# Patient Record
Sex: FEMALE | Race: WHITE | NOT HISPANIC OR LATINO | Employment: OTHER | URBAN - METROPOLITAN AREA
[De-identification: names, ages, dates, MRNs, and addresses within clinical notes are randomized per-mention and may not be internally consistent; named-entity substitution may affect disease eponyms.]

---

## 2023-10-12 DIAGNOSIS — F32.9 MAJOR DEPRESSIVE DISORDER, SINGLE EPISODE, UNSPECIFIED: ICD-10-CM

## 2023-10-13 RX ORDER — SERTRALINE HYDROCHLORIDE 100 MG/1
100 TABLET, FILM COATED ORAL DAILY
Qty: 90 TABLET | Refills: 0 | Status: SHIPPED | OUTPATIENT
Start: 2023-10-13 | End: 2024-02-13

## 2024-01-10 DIAGNOSIS — E78.1 HIGH TRIGLYCERIDES: Primary | ICD-10-CM

## 2024-01-10 RX ORDER — FENOFIBRIC ACID 135 MG/1
135 CAPSULE, DELAYED RELEASE ORAL DAILY
Qty: 90 CAPSULE | Refills: 0 | Status: SHIPPED | OUTPATIENT
Start: 2024-01-10

## 2024-01-10 RX ORDER — FENOFIBRIC ACID 135 MG/1
135 CAPSULE, DELAYED RELEASE ORAL DAILY
COMMUNITY
End: 2024-02-13 | Stop reason: WASHOUT

## 2024-02-13 ENCOUNTER — OFFICE VISIT (OUTPATIENT)
Dept: CARDIOLOGY | Facility: CLINIC | Age: 64
End: 2024-02-13
Payer: COMMERCIAL

## 2024-02-13 VITALS
OXYGEN SATURATION: 96 % | RESPIRATION RATE: 16 BRPM | SYSTOLIC BLOOD PRESSURE: 164 MMHG | HEART RATE: 66 BPM | BODY MASS INDEX: 36.37 KG/M2 | DIASTOLIC BLOOD PRESSURE: 88 MMHG | WEIGHT: 213 LBS | HEIGHT: 64 IN

## 2024-02-13 DIAGNOSIS — Z95.0 PACEMAKER: Primary | ICD-10-CM

## 2024-02-13 DIAGNOSIS — I10 HYPERTENSION, UNSPECIFIED TYPE: ICD-10-CM

## 2024-02-13 PROCEDURE — 93005 ELECTROCARDIOGRAM TRACING: CPT | Performed by: INTERNAL MEDICINE

## 2024-02-13 PROCEDURE — 1036F TOBACCO NON-USER: CPT | Performed by: INTERNAL MEDICINE

## 2024-02-13 PROCEDURE — 93010 ELECTROCARDIOGRAM REPORT: CPT | Performed by: INTERNAL MEDICINE

## 2024-02-13 PROCEDURE — 99213 OFFICE O/P EST LOW 20 MIN: CPT | Mod: 25,27 | Performed by: INTERNAL MEDICINE

## 2024-02-13 PROCEDURE — 99213 OFFICE O/P EST LOW 20 MIN: CPT | Performed by: INTERNAL MEDICINE

## 2024-02-13 PROCEDURE — 3079F DIAST BP 80-89 MM HG: CPT | Performed by: INTERNAL MEDICINE

## 2024-02-13 PROCEDURE — 3077F SYST BP >= 140 MM HG: CPT | Performed by: INTERNAL MEDICINE

## 2024-02-13 RX ORDER — AMLODIPINE BESYLATE 10 MG/1
10 TABLET ORAL DAILY
COMMUNITY

## 2024-02-13 ASSESSMENT — ENCOUNTER SYMPTOMS
GASTROINTESTINAL NEGATIVE: 1
HEMATOLOGIC/LYMPHATIC NEGATIVE: 1
RESPIRATORY NEGATIVE: 1
CARDIOVASCULAR NEGATIVE: 1
PSYCHIATRIC NEGATIVE: 1
ENDOCRINE NEGATIVE: 1
DEPRESSION: 0
MUSCULOSKELETAL NEGATIVE: 1
EYES NEGATIVE: 1
CONSTITUTIONAL NEGATIVE: 1
NEUROLOGICAL NEGATIVE: 1
ALLERGIC/IMMUNOLOGIC NEGATIVE: 1

## 2024-02-13 ASSESSMENT — PATIENT HEALTH QUESTIONNAIRE - PHQ9
SUM OF ALL RESPONSES TO PHQ9 QUESTIONS 1 AND 2: 0
1. LITTLE INTEREST OR PLEASURE IN DOING THINGS: NOT AT ALL
2. FEELING DOWN, DEPRESSED OR HOPELESS: NOT AT ALL

## 2024-02-13 NOTE — PROGRESS NOTES
"Chief Complaint:   Follow-up (Annual FUV)     History Of Present Illness:    Abril Arreola is a 63 y.o. female presenting for follow-up.  Doing well from a cardiac standpoint.  Denies any chest pain, shortness of breath, dizziness, palpitations.  No lower extremity edema.  Active taking care of her 18-month granddaughter 3 days a week.     Last Recorded Vitals:  Vitals:    02/13/24 1026   BP: 164/88   BP Location: Left arm   Patient Position: Sitting   BP Cuff Size: Large adult   Pulse: 66   Resp: 16   SpO2: 96%   Weight: 96.6 kg (213 lb)   Height: 1.626 m (5' 4\")       Past Medical History:  She has no past medical history on file.    Past Surgical History:  She has a past surgical history that includes Other surgical history (05/19/2022).      Social History:  She reports that she has never smoked. She has never used smokeless tobacco. She reports that she does not currently use alcohol. She reports that she does not use drugs.    Family History:  No family history on file.     Allergies:  Codeine and Morphine    Outpatient Medications:  Current Outpatient Medications   Medication Instructions    amLODIPine (NORVASC) 10 mg, oral, Daily, for blood pressure    choline fenofibrate (TRILIPIX) 135 mg, oral, Daily    L. acidophilus/Bifid. animalis 15.5 billion cell capsule oral    sertraline (ZOLOFT) 100 mg, oral, Daily     Review of Systems   Constitutional: Negative.    HENT: Negative.     Eyes: Negative.    Respiratory: Negative.     Cardiovascular: Negative.    Gastrointestinal: Negative.    Endocrine: Negative.    Genitourinary: Negative.    Musculoskeletal: Negative.    Skin: Negative.    Allergic/Immunologic: Negative.    Neurological: Negative.    Hematological: Negative.    Psychiatric/Behavioral: Negative.          Physical Exam:  Constitutional:       Appearance: Healthy appearance. Not in distress.   Neck:      Vascular: No JVR. JVD normal.   Pulmonary:      Effort: Pulmonary effort is normal.      " Breath sounds: Normal breath sounds. No wheezing. No rhonchi. No rales.   Chest:      Chest wall: Not tender to palpatation.   Cardiovascular:      Normal rate. Regular rhythm.      Murmurs: There is no murmur.   Edema:     Peripheral edema absent.   Abdominal:      General: Bowel sounds are normal.      Palpations: Abdomen is soft.      Tenderness: There is no abdominal tenderness.   Musculoskeletal: Normal range of motion. Skin:     General: Skin is warm and dry.   Neurological:      General: No focal deficit present.      Mental Status: Alert and oriented to person, place and time.           Last Labs:  CBC -  Lab Results   Component Value Date    WBC 14.5 (H) 01/10/2023    HGB 12.9 01/10/2023    HCT 38.7 01/10/2023    MCV 90 01/10/2023     01/10/2023       CMP -  Lab Results   Component Value Date    CALCIUM 9.1 06/23/2023    PROT 6.7 06/23/2023    ALBUMIN 4.1 06/23/2023    AST 20 06/23/2023    ALT 19 06/23/2023    ALKPHOS 45 06/23/2023    BILITOT 0.4 06/23/2023       LIPID PANEL -   Lab Results   Component Value Date    CHOL 186 06/23/2023    TRIG 111 06/23/2023    HDL 52 06/23/2023    CHHDL 3.6 06/23/2023       RENAL FUNCTION PANEL -   Lab Results   Component Value Date    GLUCOSE 104 (H) 06/23/2023     06/23/2023    K 4.2 06/23/2023     06/23/2023    CO2 26 06/23/2023    ANIONGAP 9 06/23/2023    BUN 15 06/23/2023    CREATININE 0.8 06/23/2023    CALCIUM 9.1 06/23/2023    ALBUMIN 4.1 06/23/2023        Lab Results   Component Value Date    HGBA1C 5.4 06/23/2023       Last Cardiology Tests:  EKG Independently reviewed from today: a-paced, rate 67     Echocardiogram 6/28/2022  1. The left ventricular systolic function is normal with a 60-65% estimated ejection fraction.  2. There is trace-mild mitral and tricuspid regurgitation.     CT calcium score 6/28/2022  LM 0,  LAD 0,  LCx 0,  RCA 0,     Total 0    Assessment/Plan   Very pleasant 63-year-old female with hypertension, hypertriglyceridemia,  status post pacemaker in 2007 for symptomatic bradycardia with battery change in 2019. Doing well clinically--blood pressure is elevated today, she did not take her amlodipine this morning yet.  Will hold off on escalating antihypertensive therapy for now, however will need to closely follow down the line.  Plan to continue current amlodipine and fenofibrate. F/U in one year or sooner if needed.       Gaurav Lomeli MD

## 2024-02-14 LAB
ATRIAL RATE: 67 BPM
P AXIS: 60 DEGREES
P OFFSET: 177 MS
P ONSET: 132 MS
PR INTERVAL: 132 MS
Q ONSET: 198 MS
QRS COUNT: 11 BEATS
QRS DURATION: 158 MS
QT INTERVAL: 486 MS
QTC CALCULATION(BAZETT): 513 MS
QTC FREDERICIA: 504 MS
R AXIS: 114 DEGREES
T AXIS: 0 DEGREES
T OFFSET: 441 MS
VENTRICULAR RATE: 67 BPM

## 2024-02-20 ENCOUNTER — HOSPITAL ENCOUNTER (OUTPATIENT)
Dept: CARDIOLOGY | Facility: CLINIC | Age: 64
Discharge: HOME | End: 2024-02-20
Payer: COMMERCIAL

## 2024-02-20 DIAGNOSIS — Z95.0 PRESENCE OF CARDIAC PACEMAKER: ICD-10-CM

## 2024-02-20 DIAGNOSIS — R00.1 BRADYCARDIA, UNSPECIFIED: ICD-10-CM

## 2024-02-20 PROCEDURE — 93296 REM INTERROG EVL PM/IDS: CPT

## 2024-02-20 PROCEDURE — 93294 REM INTERROG EVL PM/LDLS PM: CPT | Performed by: INTERNAL MEDICINE

## 2024-08-21 DIAGNOSIS — Z95.0 PACEMAKER: Primary | ICD-10-CM

## 2024-08-21 DIAGNOSIS — R00.1 BRADYCARDIA ON ECG: ICD-10-CM

## 2024-08-28 ENCOUNTER — HOSPITAL ENCOUNTER (OUTPATIENT)
Dept: CARDIOLOGY | Facility: HOSPITAL | Age: 64
Discharge: HOME | End: 2024-08-28
Payer: COMMERCIAL

## 2024-08-28 DIAGNOSIS — Z95.0 PRESENCE OF CARDIAC PACEMAKER: ICD-10-CM

## 2024-08-28 DIAGNOSIS — R00.1 BRADYCARDIA, UNSPECIFIED: ICD-10-CM

## 2024-08-28 PROCEDURE — 93280 PM DEVICE PROGR EVAL DUAL: CPT | Performed by: NURSE PRACTITIONER

## 2024-08-28 PROCEDURE — 93280 PM DEVICE PROGR EVAL DUAL: CPT

## 2025-02-11 ENCOUNTER — OFFICE VISIT (OUTPATIENT)
Dept: CARDIOLOGY | Facility: CLINIC | Age: 65
End: 2025-02-11
Payer: COMMERCIAL

## 2025-02-11 VITALS
WEIGHT: 218 LBS | SYSTOLIC BLOOD PRESSURE: 167 MMHG | HEIGHT: 64 IN | OXYGEN SATURATION: 96 % | BODY MASS INDEX: 37.22 KG/M2 | DIASTOLIC BLOOD PRESSURE: 96 MMHG | HEART RATE: 80 BPM

## 2025-02-11 DIAGNOSIS — Z95.0 PACEMAKER: ICD-10-CM

## 2025-02-11 DIAGNOSIS — R00.1 BRADYCARDIA, UNSPECIFIED: ICD-10-CM

## 2025-02-11 DIAGNOSIS — I10 ESSENTIAL HYPERTENSION: Primary | ICD-10-CM

## 2025-02-11 PROCEDURE — 3080F DIAST BP >= 90 MM HG: CPT | Performed by: INTERNAL MEDICINE

## 2025-02-11 PROCEDURE — 3077F SYST BP >= 140 MM HG: CPT | Performed by: INTERNAL MEDICINE

## 2025-02-11 PROCEDURE — 93005 ELECTROCARDIOGRAM TRACING: CPT | Performed by: INTERNAL MEDICINE

## 2025-02-11 PROCEDURE — 99213 OFFICE O/P EST LOW 20 MIN: CPT | Performed by: INTERNAL MEDICINE

## 2025-02-11 PROCEDURE — 3008F BODY MASS INDEX DOCD: CPT | Performed by: INTERNAL MEDICINE

## 2025-02-11 ASSESSMENT — PATIENT HEALTH QUESTIONNAIRE - PHQ9
1. LITTLE INTEREST OR PLEASURE IN DOING THINGS: NOT AT ALL
2. FEELING DOWN, DEPRESSED OR HOPELESS: NOT AT ALL
SUM OF ALL RESPONSES TO PHQ9 QUESTIONS 1 AND 2: 0

## 2025-02-11 NOTE — PROGRESS NOTES
"Chief Complaint:   No chief complaint on file.     History Of Present Illness:    Abril Arreola is a 64 y.o. female presenting with ***.     Last Recorded Vitals:  Vitals:    02/11/25 1011   BP: (!) 167/96   BP Location: Right arm   Patient Position: Sitting   Pulse: 80   SpO2: 96%   Weight: 98.9 kg (218 lb)   Height: 1.626 m (5' 4\")       Past Medical History:  She has no past medical history on file.    Past Surgical History:  She has a past surgical history that includes Other surgical history (05/19/2022).      Social History:  She reports that she has never smoked. She has never used smokeless tobacco. She reports that she does not currently use alcohol. She reports that she does not use drugs.    Family History:  No family history on file.     Allergies:  Codeine and Morphine    Outpatient Medications:  Current Outpatient Medications   Medication Instructions    amLODIPine (NORVASC) 10 mg, Daily    choline fenofibrate (TRILIPIX) 135 mg, oral, Daily    L. acidophilus/Bifid. animalis 15.5 billion cell capsule oral    sertraline (ZOLOFT) 100 mg, oral, Daily       Physical Exam:  ***     Last Labs:  CBC -  Lab Results   Component Value Date    WBC 14.5 (H) 01/10/2023    HGB 12.9 01/10/2023    HCT 38.7 01/10/2023    MCV 90 01/10/2023     01/10/2023       CMP -  Lab Results   Component Value Date    CALCIUM 9.1 06/23/2023    PROT 6.7 06/23/2023    ALBUMIN 4.1 06/23/2023    AST 20 06/23/2023    ALT 19 06/23/2023    ALKPHOS 45 06/23/2023    BILITOT 0.4 06/23/2023       LIPID PANEL -   Lab Results   Component Value Date    CHOL 186 06/23/2023    TRIG 111 06/23/2023    HDL 52 06/23/2023    CHHDL 3.6 06/23/2023       RENAL FUNCTION PANEL -   Lab Results   Component Value Date    GLUCOSE 104 (H) 06/23/2023     06/23/2023    K 4.2 06/23/2023     06/23/2023    CO2 26 06/23/2023    ANIONGAP 9 06/23/2023    BUN 15 06/23/2023    CREATININE 0.8 06/23/2023    CALCIUM 9.1 06/23/2023    ALBUMIN 4.1 " 06/23/2023        Lab Results   Component Value Date    HGBA1C 5.4 06/23/2023       Last Cardiology Tests:  EKG Independently reviewed from today: a-paced, rate 67     Echocardiogram 6/28/2022  1. The left ventricular systolic function is normal with a 60-65% estimated ejection fraction.  2. There is trace-mild mitral and tricuspid regurgitation.     CT calcium score 6/28/2022  LM 0,  LAD 0,  LCx 0,  RCA 0,     Total 0    Assessment/Plan   Very pleasant 63-year-old female with hypertension, hypertriglyceridemia, status post pacemaker in 2007 for symptomatic bradycardia with battery change in 2019. Doing well clinically--blood pressure is elevated today, she did not take her amlodipine this morning yet.  Will hold off on escalating antihypertensive therapy for now, however will need to closely follow down the line.  Plan to continue current amlodipine and fenofibrate. F/U in one year or sooner if needed.       Gaurav Lomeli MD

## 2025-02-12 LAB
ATRIAL RATE: 65 BPM
P AXIS: 14 DEGREES
P OFFSET: 176 MS
P ONSET: 138 MS
PR INTERVAL: 144 MS
Q ONSET: 210 MS
QRS COUNT: 11 BEATS
QRS DURATION: 134 MS
QT INTERVAL: 476 MS
QTC CALCULATION(BAZETT): 495 MS
QTC FREDERICIA: 488 MS
R AXIS: 133 DEGREES
T AXIS: 15 DEGREES
T OFFSET: 448 MS
VENTRICULAR RATE: 65 BPM

## 2025-02-18 ENCOUNTER — HOSPITAL ENCOUNTER (OUTPATIENT)
Dept: CARDIOLOGY | Facility: CLINIC | Age: 65
Discharge: HOME | End: 2025-02-18
Payer: COMMERCIAL

## 2025-02-18 DIAGNOSIS — R00.1 BRADYCARDIA ON ECG: ICD-10-CM

## 2025-02-18 DIAGNOSIS — Z95.0 PACEMAKER: ICD-10-CM

## 2025-02-18 PROCEDURE — 93294 REM INTERROG EVL PM/LDLS PM: CPT | Performed by: INTERNAL MEDICINE

## 2025-02-18 PROCEDURE — 93296 REM INTERROG EVL PM/IDS: CPT

## 2025-05-06 ENCOUNTER — OFFICE VISIT (OUTPATIENT)
Dept: PRIMARY CARE | Facility: CLINIC | Age: 65
End: 2025-05-06
Payer: COMMERCIAL

## 2025-05-06 VITALS
BODY MASS INDEX: 37.59 KG/M2 | HEART RATE: 84 BPM | WEIGHT: 219 LBS | DIASTOLIC BLOOD PRESSURE: 78 MMHG | SYSTOLIC BLOOD PRESSURE: 136 MMHG

## 2025-05-06 DIAGNOSIS — M62.838 MUSCLE SPASM: Primary | ICD-10-CM

## 2025-05-06 PROBLEM — I10 HYPERTENSION: Status: ACTIVE | Noted: 2025-05-06

## 2025-05-06 PROBLEM — G47.30 SLEEP APNEA: Status: ACTIVE | Noted: 2025-05-06

## 2025-05-06 PROBLEM — Z95.0 PRESENCE OF CARDIAC PACEMAKER: Status: ACTIVE | Noted: 2025-05-06

## 2025-05-06 PROBLEM — R13.10 DYSPHAGIA: Status: ACTIVE | Noted: 2025-05-06

## 2025-05-06 PROBLEM — F32.A DEPRESSION: Status: ACTIVE | Noted: 2025-05-06

## 2025-05-06 PROBLEM — N95.1 MENOPAUSAL SYMPTOM: Status: ACTIVE | Noted: 2025-05-06

## 2025-05-06 PROBLEM — Z98.890 HISTORY OF FUNDOPLICATION: Status: ACTIVE | Noted: 2025-05-06

## 2025-05-06 PROBLEM — J40 BRONCHITIS: Status: ACTIVE | Noted: 2025-05-06

## 2025-05-06 PROBLEM — R00.1 BRADYCARDIA: Status: ACTIVE | Noted: 2025-05-06

## 2025-05-06 PROBLEM — K44.9 HIATAL HERNIA: Status: ACTIVE | Noted: 2025-05-06

## 2025-05-06 PROBLEM — I42.9 CARDIOMYOPATHY: Status: ACTIVE | Noted: 2025-05-06

## 2025-05-06 PROBLEM — R06.09 DOE (DYSPNEA ON EXERTION): Status: ACTIVE | Noted: 2025-05-06

## 2025-05-06 PROBLEM — E78.1 PURE HYPERGLYCERIDEMIA: Status: ACTIVE | Noted: 2025-05-06

## 2025-05-06 PROBLEM — K21.9 GASTROESOPHAGEAL REFLUX DISEASE: Status: ACTIVE | Noted: 2025-05-06

## 2025-05-06 PROCEDURE — 99213 OFFICE O/P EST LOW 20 MIN: CPT | Performed by: FAMILY MEDICINE

## 2025-05-06 PROCEDURE — 3078F DIAST BP <80 MM HG: CPT | Performed by: FAMILY MEDICINE

## 2025-05-06 PROCEDURE — 1036F TOBACCO NON-USER: CPT | Performed by: FAMILY MEDICINE

## 2025-05-06 PROCEDURE — 3075F SYST BP GE 130 - 139MM HG: CPT | Performed by: FAMILY MEDICINE

## 2025-05-06 RX ORDER — ACETAMINOPHEN 325 MG/1
325 TABLET ORAL EVERY 6 HOURS
COMMUNITY
Start: 2023-01-10

## 2025-05-06 RX ORDER — SERTRALINE HYDROCHLORIDE 100 MG/1
100 TABLET, FILM COATED ORAL
COMMUNITY
Start: 2022-06-08

## 2025-05-06 RX ORDER — CYCLOBENZAPRINE HCL 10 MG
10 TABLET ORAL 3 TIMES DAILY PRN
Qty: 30 TABLET | Refills: 0 | Status: SHIPPED | OUTPATIENT
Start: 2025-05-06 | End: 2025-07-05

## 2025-05-06 ASSESSMENT — PAIN SCALES - GENERAL: PAINLEVEL_OUTOF10: 5

## 2025-05-06 ASSESSMENT — PATIENT HEALTH QUESTIONNAIRE - PHQ9
SUM OF ALL RESPONSES TO PHQ9 QUESTIONS 1 AND 2: 0
2. FEELING DOWN, DEPRESSED OR HOPELESS: NOT AT ALL
1. LITTLE INTEREST OR PLEASURE IN DOING THINGS: NOT AT ALL

## 2025-05-06 ASSESSMENT — COLUMBIA-SUICIDE SEVERITY RATING SCALE - C-SSRS
6. HAVE YOU EVER DONE ANYTHING, STARTED TO DO ANYTHING, OR PREPARED TO DO ANYTHING TO END YOUR LIFE?: NO
2. HAVE YOU ACTUALLY HAD ANY THOUGHTS OF KILLING YOURSELF?: NO
1. IN THE PAST MONTH, HAVE YOU WISHED YOU WERE DEAD OR WISHED YOU COULD GO TO SLEEP AND NOT WAKE UP?: NO

## 2025-05-06 NOTE — PROGRESS NOTES
Subjective   Patient ID: Abril Arreola is a 64 y.o. female who presents for Right Shoulder Pain (X 1 month).    HPI   She presents with right shoulder pain she has had for about a month now.  She states she was cleaning the walls with her brush and it was after that this happened.  She has been using heat on it, which helps but she cannot seem to get it to go away.  Wondering if a muscle relaxer might help.  Pain can radiate up into her head and then down the back of her arm.  Does keep her up at night.    Review of Systems    Objective   /78   Pulse 84   Wt 99.3 kg (219 lb)   BMI 37.59 kg/m²     Physical Exam  She is alert, no apparent stress, her affect is pleasant.  Respirations are easy.  She has tender spasm in the right superior trapezius, worse at the GB 20 point but it also goes up into her neck.      Assessment/Plan   Diagnoses and all orders for this visit:  Muscle spasm  -     cyclobenzaprine (Flexeril) 10 mg tablet; Take 1 tablet (10 mg) by mouth 3 times a day as needed for muscle spasms.  -     Referral to Physical Therapy; Future  She may use the Flexeril certainly at night but also up to 3 times a day.  She is aware that it may make her drowsy.  I referred her over to physical therapy.  She let me know if this does not resolve it.

## 2025-05-06 NOTE — PATIENT INSTRUCTIONS
Schedule PT.   Let me know if they don't resolve it.   Do the [employer] daily for 5 minutes.

## 2025-05-13 ENCOUNTER — EVALUATION (OUTPATIENT)
Dept: PHYSICAL THERAPY | Facility: CLINIC | Age: 65
End: 2025-05-13
Payer: COMMERCIAL

## 2025-05-13 DIAGNOSIS — M62.838 MUSCLE SPASM: ICD-10-CM

## 2025-05-13 PROCEDURE — 97161 PT EVAL LOW COMPLEX 20 MIN: CPT | Mod: GP | Performed by: PHYSICAL THERAPIST

## 2025-05-13 ASSESSMENT — ENCOUNTER SYMPTOMS
OCCASIONAL FEELINGS OF UNSTEADINESS: 0
DEPRESSION: 0
LOSS OF SENSATION IN FEET: 0

## 2025-05-13 NOTE — PROGRESS NOTES
Physical Therapy    Physical Therapy Evaluation and Treatment    Patient Name: Abril Arreola  MRN: [id number]  Encounter Date: 5/13/2025     Time Calculation  Start Time: 0949  Stop Time: 1033  Time Calculation (min): 44 min    Current Problem:   1. Muscle spasm  Referral to Physical Therapy    Follow Up In Physical Therapy        Insurance: 2025: Leda KVY - NO AUTH / $1000 DED not met / 75% coins / $4500 OOP not met / 20V - 0 used / AVAILITY [id number] / ds 5/11/25 //  Visits:  Combined Inst/Prof, In/Out, not comb with other therapy.   Addtl visits require Med Necessity review.      Relevant Past Medical History: OA, HTN, Sleep Apnea  Surgical History: Hiatal Hernia 2024, pacemaker 2023  Medications: Flexeril  Allergies: Codeine, Tetracycline, Morphine.    No latex allergy.    Precautions:   STEADI Fall Risk: 0 (score of 4+ indicates fall risk)  Patient has a pacemaker.     SUBJECTIVE:   The patient is a 64 year old female presenting with right shoulder pain of one month duration.  She reports that she was cleaning her walls and was completing an overhead push motion with a brush.  She woke up the next morning with right shoulder pain and pain radiating down her right arm with certain motions and activities.    Imaging:   None     Pain:   Current: 3/10  Lowest: 2/10  Highest: 10/10  Location: Right Shoulder, radiating down her right arm.  Description: Burning Pain  Aggravating Factors: Certain motions, Using right UE for activities.  Relieving Factors: Rest  Sleep Pattern: Patient has sleep disturbance.  Previous Interventions: None     Red flags:   Red flags   Hx of CA No   Pacemaker/ Electronic Implant No   Saddle Anesthesia No   Bowel/Bladder Changes No   Sudden Weakness No   Recent Falls (within last 6mo) No       Hand Dominance: Right  Occupation: Retired  Hobbies: Gardening  Home Living: Has a 2 year old  granddaughter that she needs to lift and carry.  Current Level of Function: Independent  Prior  "Level of Function: Independent    Patient/Family Goal: \"To have free movement with no pain\".    Outcome Measures:   Other Measures  Other Outcome Measures: ASES Right=24  Left=23    OBJECTIVE:    Cognition: Alert and oriented  Posture: Rounded Shoulders   Gait: No abnormality  Functional Mobility: Independent  Palpation: Tender right upper trap, tender right PS    AROM  Cervical Rotation Right 23   Left 42  Full Shoulder AROM    Shoulder ER Right 4/5   Left 4+/5  Shoulder Flexion Right 4+/5  Left 5/5    Negative compression  Negative Distraction  Negative Alars    Treatments:  Therapeutic Exercise: (10 minutes)  - Supine chin tuck with small towel roll.  - UT Stretching 2 x 15 seconds.  - Seated bilateral shoulder ER versus level 1 band.  - Doorway pec Stretch 2 x 15 seconds.    Manual Therapy: (10 minutes)  Cervical Traction by PT  Suboccipital Release  STM to right UT with patient supine.    Kinesiotaping to right UT for support and to rotator cuff for support with two I strips.  The patient was instructed with skin and tape care.    OP Education: POC, Goals, HEP     HEP:  Access Code: [email or web address]  URL: [email or web address]  Date: 05/13/2025  Prepared by: Zachariah Dey  Exercises  - Supine Cervical Retraction with Towel  - 2 x daily - 7 x weekly - 2 sets - 10 reps - 1 sec hold  - Seated Cervical Sidebending Stretch  - 2 x daily - 7 x weekly - 2 sets - 15 hold  - Shoulder External Rotation and Scapular Retraction with Resistance  - 2 x daily - 7 x weekly - 2 sets - 10 reps - 1 sec hold  - Doorway Pec Stretch at 60 Elevation  - 2 x daily - 7 x weekly - 2 sets - 15 hold    Response to treatment: No pain increase.    ASSESSMENT:   The pt is a 64 year old female presenting with right sided neck and shoulder pain limiting functional mobility.    Complexity of Evaluation:   Based on the history including personal factors and/or comorbidities, examination of body systems including body structures and function, activity " limitations, and/or participation restrictions, as well as clinical presentation, patient meets criteria for a low complexity evaluation.  Low complexity due to patient's clinical presentation being stable and uncomplicated by any significant comorbidities that may affect rehab tolerance and progression.         PLAN:  OP PT PLAN:    ESTIM is not indicated...pt has a pacemaker.    Treatment/Interventions: Blood Flow Restriction Therapy  , Cryotherapy , Dry Needling  , Education/Instruction , Hot Pack , Manual Therapy  , Mechanical Traction  , Neuromuscular re-education , Taping techniques , Therapeutic activities , and Therapeutic exercise    PT Plan: Skilled PT   PT Frequency: 1-2 times per week  Duration:  10 visits  Insurance: 2025: Anth KVY - NO AUTH / $1000 DED not met / 75% coins / $4500 OOP not met / 20V - 0 used / AVAILITY [id number] / ds 5/11/25 //  Visits:  Combined Inst/Prof, In/Out, not comb with other therapy.   Addtl visits require Med Necessity review.    Rehab Potential: Good  Plan of Care Agreement: Patient         Goals:   5 visits (STG)  Pt will be independent with home exercise program with handouts.   2.   Pt will be independent with correction of posture.   3.   Increase Cervical AROM by 10 degrees in order to progress with improving functional mobility.   4.   The pt will be able to complete chin tuck without pain increase.     10 visits (LTG)  Patient to rotate neck to Right / Left in order to look over shoulder when driving to safely switch lanes and back up a care with no pain.   2.   Patient to be able to work around her home  without headaches, neck pain, or dizziness interfering.  3.   Patient to demonstrate the use of proper body mechanics and posture when performing their job to decrease symptoms.   4.   Patent to demonstrate proper body mechanics and posture while sleeping to reduce symptoms.     5.    Patient will be able to lift her grandchild without limitations.

## 2025-05-20 DIAGNOSIS — F32.A DEPRESSION, UNSPECIFIED DEPRESSION TYPE: Primary | ICD-10-CM

## 2025-05-21 ENCOUNTER — TREATMENT (OUTPATIENT)
Dept: PHYSICAL THERAPY | Facility: CLINIC | Age: 65
End: 2025-05-21
Payer: COMMERCIAL

## 2025-05-21 DIAGNOSIS — M62.838 MUSCLE SPASM: ICD-10-CM

## 2025-05-21 PROCEDURE — 97110 THERAPEUTIC EXERCISES: CPT | Mod: GP,CQ

## 2025-05-21 PROCEDURE — 97140 MANUAL THERAPY 1/> REGIONS: CPT | Mod: GP,CQ

## 2025-05-21 RX ORDER — SERTRALINE HYDROCHLORIDE 100 MG/1
100 TABLET, FILM COATED ORAL DAILY
Qty: 90 TABLET | Refills: 1 | Status: SHIPPED | OUTPATIENT
Start: 2025-05-21

## 2025-05-21 NOTE — PROGRESS NOTES
Physical Therapy    Physical Therapy Treatment    Patient Name: Abril Arreola  MRN: [id number]  Encounter Date: 5/21/2025     Time Calculation  Start Time: 1010  Stop Time: 1050  Time Calculation (min): 40 min    Current Problem:   1. Muscle spasm  Referral to Physical Therapy    Follow Up In Physical Therapy        Insurance: 2025: [employer] KVY - NO AUTH / $1000 DED not met / 75% coins / $4500 OOP not met / 20V - 0 used / AVAILITY [id number] / ds 5/11/25 //  Visits:  Combined Inst/Prof, In/Out, not comb with other therapy.   Addtl visits require Med Necessity review.      Relevant Past Medical History: OA, HTN, Sleep Apnea  Surgical History: Hiatal Hernia 2024, pacemaker 2023  Medications: Flexeril  Allergies: Codeine, Tetracycline, Morphine.    No latex allergy.    Precautions:   STEADI Fall Risk: 0 (score of 4+ indicates fall risk)  Patient has a pacemaker.     SUBJECTIVE:       The patient is a 64 year old female presenting with right shoulder pain of one month duration.  She reports that she was cleaning her walls and was completing an overhead push motion with a brush.  She woke up the next morning with right shoulder pain and pain radiating down her right arm with certain motions and activities.    Imaging:   None     Pain:   Current: 3/10  Lowest: 2/10  Highest: 10/10  Location: Right Shoulder, radiating down her right arm.  Description: Burning Pain  Aggravating Factors: Certain motions, Using right UE for activities.  Relieving Factors: Rest  Sleep Pattern: Patient has sleep disturbance.  Previous Interventions: None     Red flags:   Red flags   Hx of CA No   Pacemaker/ Electronic Implant No   Saddle Anesthesia No   Bowel/Bladder Changes No   Sudden Weakness No   Recent Falls (within last 6mo) No       Hand Dominance: Right  Occupation: Retired  Hobbies: Gardening  Home Living: Has a 2 year old  granddaughter that she needs to lift and carry.  Current Level of Function: Independent  Prior Level of  "Function: Independent    Patient/Family Goal: \"To have free movement with no pain\".    Outcome Measures:        OBJECTIVE:    Cognition: Alert and oriented  Posture: Rounded Shoulders   Gait: No abnormality  Functional Mobility: Independent  Palpation: Tender right upper trap, tender right PS    AROM  Cervical Rotation Right 23   Left 42  Full Shoulder AROM    Shoulder ER Right 4/5   Left 4+/5  Shoulder Flexion Right 4+/5  Left 5/5    Negative compression  Negative Distraction  Negative Alars    Treatments:  Therapeutic Exercise: 10 minutes)  - Supine chin tuck with small towel roll.---performs at home  - UT Stretching 2 x 15 seconds.---at home  - Seated bilateral shoulder ER versus level 1 band.--at home  - Doorway pec Stretch 2 x 15 seconds.  ---At home  Over the door pulleys  UE bike    Manual Therapy: (30 minutes)   Manual Cervical Traction by PT in supine  Suboccipital Release  STM to right UT with patient in sitting        ---------------DNP this visit - to re assess next session  Kinesiotaping to right UT for support and to rotator cuff for support with two I strips.  The patient was instructed with skin and tape care.    OP Education: POC, Goals, HEP     HEP:  Access Code: [email or web address]  URL: [email or web address]  Date: 05/13/2025  Prepared by: Zachariah Dey  Exercises  - Supine Cervical Retraction with Towel  - 2 x daily - 7 x weekly - 2 sets - 10 reps - 1 sec hold  - Seated Cervical Sidebending Stretch  - 2 x daily - 7 x weekly - 2 sets - 15 hold  - Shoulder External Rotation and Scapular Retraction with Resistance  - 2 x daily - 7 x weekly - 2 sets - 10 reps - 1 sec hold  - Doorway Pec Stretch at 60 Elevation  - 2 x daily - 7 x weekly - 2 sets - 15 hold    Response to treatment: No pain increase.    ASSESSMENT:  [place] session well reported rotation and flexion to R increases discomfort on L  To re assess next session if taping helped last visit VS this visit      The pt is a 64 year old female presenting " with right sided neck and shoulder pain limiting functional mobility.    Complexity of Evaluation:   Based on the history including personal factors and/or comorbidities, examination of body systems including body structures and function, activity limitations, and/or participation restrictions, as well as clinical presentation, patient meets criteria for a low complexity evaluation.  Low complexity due to patient's clinical presentation being stable and uncomplicated by any significant comorbidities that may affect rehab tolerance and progression.         PLAN:  OP PT PLAN:    ESTIM is not indicated...pt has a pacemaker.    Treatment/Interventions: Blood Flow Restriction Therapy  , Cryotherapy , Dry Needling  , Education/Instruction , Hot Pack , Manual Therapy  , Mechanical Traction  , Neuromuscular re-education , Taping techniques , Therapeutic activities , and Therapeutic exercise    PT Plan: Skilled PT   PT Frequency: 1-2 times per week  Duration:  10 visits  Insurance: 2025: Anth KVY - NO AUTH / $1000 DED not met / 75% coins / $4500 OOP not met / 20V - 0 used / AVAILITY [id number] / ds 5/11/25 //  Visits:  Combined Inst/Prof, In/Out, not comb with other therapy.   Addtl visits require Med Necessity review.    Rehab Potential: Good  Plan of Care Agreement: Patient         Goals:   5 visits (STG)  Pt will be independent with home exercise program with handouts.   2.   Pt will be independent with correction of posture.   3.   Increase Cervical AROM by 10 degrees in order to progress with improving functional mobility.   4.   The pt will be able to complete chin tuck without pain increase.     10 visits (LTG)  Patient to rotate neck to Right / Left in order to look over shoulder when driving to safely switch lanes and back up a care with no pain.   2.   Patient to be able to work around her home  without headaches, neck pain, or dizziness interfering.  3.   Patient to demonstrate the use of proper body mechanics  and posture when performing their job to decrease symptoms.   4.   Patent to demonstrate proper body mechanics and posture while sleeping to reduce symptoms.     5.    Patient will be able to lift her grandchild without limitations.

## 2025-05-27 ENCOUNTER — APPOINTMENT (OUTPATIENT)
Dept: PHYSICAL THERAPY | Facility: CLINIC | Age: 65
End: 2025-05-27
Payer: COMMERCIAL

## 2025-06-02 ENCOUNTER — HOSPITAL ENCOUNTER (OUTPATIENT)
Dept: CARDIOLOGY | Facility: CLINIC | Age: 65
Discharge: HOME | End: 2025-06-02
Payer: COMMERCIAL

## 2025-06-02 DIAGNOSIS — Z95.0 PRESENCE OF CARDIAC PACEMAKER: ICD-10-CM

## 2025-06-02 DIAGNOSIS — R00.1 BRADYCARDIA, UNSPECIFIED: ICD-10-CM

## 2025-06-02 PROCEDURE — 93296 REM INTERROG EVL PM/IDS: CPT

## 2025-06-02 PROCEDURE — 93294 REM INTERROG EVL PM/LDLS PM: CPT | Performed by: INTERNAL MEDICINE

## 2025-06-03 ENCOUNTER — APPOINTMENT (OUTPATIENT)
Dept: PHYSICAL THERAPY | Facility: CLINIC | Age: 65
End: 2025-06-03
Payer: COMMERCIAL

## 2025-06-26 ENCOUNTER — OFFICE VISIT (OUTPATIENT)
Dept: PRIMARY CARE | Facility: CLINIC | Age: 65
End: 2025-06-26
Payer: COMMERCIAL

## 2025-06-26 VITALS
BODY MASS INDEX: 37.35 KG/M2 | HEART RATE: 64 BPM | SYSTOLIC BLOOD PRESSURE: 142 MMHG | DIASTOLIC BLOOD PRESSURE: 88 MMHG | WEIGHT: 217.6 LBS

## 2025-06-26 DIAGNOSIS — F32.A DEPRESSION, UNSPECIFIED DEPRESSION TYPE: ICD-10-CM

## 2025-06-26 DIAGNOSIS — Z53.20 MAMMOGRAM DECLINED: ICD-10-CM

## 2025-06-26 DIAGNOSIS — I42.9 CARDIOMYOPATHY, UNSPECIFIED TYPE (MULTI): ICD-10-CM

## 2025-06-26 DIAGNOSIS — Z00.00 WELLNESS EXAMINATION: Primary | ICD-10-CM

## 2025-06-26 DIAGNOSIS — Z95.0 PRESENCE OF CARDIAC PACEMAKER: ICD-10-CM

## 2025-06-26 DIAGNOSIS — I10 PRIMARY HYPERTENSION: ICD-10-CM

## 2025-06-26 PROCEDURE — 3079F DIAST BP 80-89 MM HG: CPT | Performed by: FAMILY MEDICINE

## 2025-06-26 PROCEDURE — 3077F SYST BP >= 140 MM HG: CPT | Performed by: FAMILY MEDICINE

## 2025-06-26 PROCEDURE — 1036F TOBACCO NON-USER: CPT | Performed by: FAMILY MEDICINE

## 2025-06-26 PROCEDURE — 99396 PREV VISIT EST AGE 40-64: CPT | Performed by: FAMILY MEDICINE

## 2025-06-26 RX ORDER — SERTRALINE HYDROCHLORIDE 100 MG/1
100 TABLET, FILM COATED ORAL DAILY
Qty: 90 TABLET | Refills: 3 | Status: SHIPPED | OUTPATIENT
Start: 2025-06-26

## 2025-06-26 ASSESSMENT — ENCOUNTER SYMPTOMS
DYSPHORIC MOOD: 0
DIFFICULTY URINATING: 0
SLEEP DISTURBANCE: 0
PALPITATIONS: 0
TROUBLE SWALLOWING: 0
NERVOUS/ANXIOUS: 0
DIZZINESS: 0
UNEXPECTED WEIGHT CHANGE: 0
CHEST TIGHTNESS: 0
POLYDIPSIA: 0
SHORTNESS OF BREATH: 0
HEADACHES: 0
DIARRHEA: 0
BLOOD IN STOOL: 0
FATIGUE: 0
CONSTIPATION: 0
ABDOMINAL PAIN: 0

## 2025-06-26 ASSESSMENT — PROMIS GLOBAL HEALTH SCALE
RATE_AVERAGE_FATIGUE: MILD
CARRYOUT_PHYSICAL_ACTIVITIES: COMPLETELY
RATE_AVERAGE_PAIN: 1
RATE_QUALITY_OF_LIFE: VERY GOOD
RATE_SOCIAL_SATISFACTION: VERY GOOD
RATE_GENERAL_HEALTH: VERY GOOD
EMOTIONAL_PROBLEMS: RARELY
RATE_PHYSICAL_HEALTH: VERY GOOD
CARRYOUT_SOCIAL_ACTIVITIES: VERY GOOD
RATE_MENTAL_HEALTH: VERY GOOD

## 2025-06-26 ASSESSMENT — PAIN SCALES - GENERAL: PAINLEVEL_OUTOF10: 0-NO PAIN

## 2025-06-26 NOTE — PATIENT INSTRUCTIONS
Doing well.   Check the blood test fasting.   If you change you mind on the mammogram for this year, let me know.   Up to date on cologuard (due next summer).   Walk every day for 30 minutes.

## 2025-06-26 NOTE — PROGRESS NOTES
Subjective   Patient ID: Abril Arreola is a 64 y.o. female who presents for Annual Exam.    HPI   She presents today for wellness exam.  Last labs I have are from June 2023.  She states she is feeling well.  She sees Dr. Lomeli for cardiomyopathy and follows with another doctor to interrogate her pacemaker.  Depression has been very well-controlled on medication.  She walks daily with her dog.  Her last Cologuard was in 2023.  Has not had a mammogram recently.  States she does not have a family history of breast cancer.  No heartburn since her fundoplication.    Review of Systems   Constitutional:  Negative for fatigue and unexpected weight change.   HENT:  Negative for congestion and trouble swallowing.    Respiratory:  Negative for chest tightness and shortness of breath.    Cardiovascular:  Negative for chest pain, palpitations and leg swelling.   Gastrointestinal:  Negative for abdominal pain, blood in stool, constipation and diarrhea.   Endocrine: Negative for polydipsia and polyuria.   Genitourinary:  Negative for difficulty urinating.   Neurological:  Negative for dizziness and headaches.   Psychiatric/Behavioral:  Negative for dysphoric mood and sleep disturbance. The patient is not nervous/anxious.        Objective   /88   Pulse 64   Wt 98.7 kg (217 lb 9.6 oz)   BMI 37.35 kg/m²     Physical Exam  Vitals reviewed.   Constitutional:       Appearance: Normal appearance.   HENT:      Right Ear: Tympanic membrane, ear canal and external ear normal.      Left Ear: Tympanic membrane, ear canal and external ear normal.      Nose: Nose normal.      Mouth/Throat:      Mouth: Mucous membranes are moist.      Pharynx: Oropharynx is clear.   Eyes:      Conjunctiva/sclera: Conjunctivae normal.   Neck:      Thyroid: No thyromegaly or thyroid tenderness.      Vascular: No carotid bruit.   Cardiovascular:      Rate and Rhythm: Normal rate and regular rhythm.      Heart sounds: No murmur heard.  Pulmonary:       Effort: Pulmonary effort is normal.      Breath sounds: Normal breath sounds.   Abdominal:      General: Abdomen is flat.      Palpations: Abdomen is soft. There is no mass.      Tenderness: There is no abdominal tenderness.   Musculoskeletal:      Cervical back: Neck supple.      Right lower leg: No edema.      Left lower leg: No edema.   Lymphadenopathy:      Cervical: No cervical adenopathy.   Skin:     General: Skin is warm and dry.   Neurological:      Mental Status: She is alert.   Psychiatric:         Mood and Affect: Mood normal.         Assessment/Plan   Diagnoses and all orders for this visit:  Wellness examination  Depression, unspecified depression type  -     sertraline (Zoloft) 100 mg tablet; Take 1 tablet (100 mg) by mouth once daily.  Primary hypertension  -     Comprehensive Metabolic Panel; Future  -     Lipid Panel; Future  -     CBC; Future  Cardiomyopathy, unspecified type (Multi)  Presence of cardiac pacemaker  Mammogram declined  Overall doing well.  Blood pressure is a little high today but she states she is forgot her medications this morning.  She will check it at home and let me know if it is consistently above 140/90.  She will continue to follow with her cardiologists.  We discussed mammograms which she declines.  Did tell her she changed her mind on this but she can call.  It is a good way to screen as the earlier we catch these cancers the better.  She will follow-up with me in a year and she is also seeing her cardiologists.

## 2025-06-27 ENCOUNTER — APPOINTMENT (OUTPATIENT)
Dept: PRIMARY CARE | Facility: CLINIC | Age: 65
End: 2025-06-27
Payer: COMMERCIAL

## 2025-06-28 LAB
ALBUMIN SERPL-MCNC: 4.1 G/DL (ref 3.6–5.1)
ALP SERPL-CCNC: 54 U/L (ref 37–153)
ALT SERPL-CCNC: 20 U/L (ref 6–29)
ANION GAP SERPL CALCULATED.4IONS-SCNC: 7 MMOL/L (CALC) (ref 7–17)
AST SERPL-CCNC: 17 U/L (ref 10–35)
BILIRUB SERPL-MCNC: 1 MG/DL (ref 0.2–1.2)
BUN SERPL-MCNC: 13 MG/DL (ref 7–25)
CALCIUM SERPL-MCNC: 9.1 MG/DL (ref 8.6–10.4)
CHLORIDE SERPL-SCNC: 106 MMOL/L (ref 98–110)
CHOLEST SERPL-MCNC: 222 MG/DL
CHOLEST/HDLC SERPL: 4.9 (CALC)
CO2 SERPL-SCNC: 27 MMOL/L (ref 20–32)
CREAT SERPL-MCNC: 0.73 MG/DL (ref 0.5–1.05)
EGFRCR SERPLBLD CKD-EPI 2021: 92 ML/MIN/1.73M2
ERYTHROCYTE [DISTWIDTH] IN BLOOD BY AUTOMATED COUNT: 12.5 % (ref 11–15)
GLUCOSE SERPL-MCNC: 119 MG/DL (ref 65–99)
HCT VFR BLD AUTO: 42.4 % (ref 35–45)
HDLC SERPL-MCNC: 45 MG/DL
HGB BLD-MCNC: 13.7 G/DL (ref 11.7–15.5)
LDLC SERPL CALC-MCNC: 142 MG/DL (CALC)
MCH RBC QN AUTO: 30.5 PG (ref 27–33)
MCHC RBC AUTO-ENTMCNC: 32.3 G/DL (ref 32–36)
MCV RBC AUTO: 94.4 FL (ref 80–100)
NONHDLC SERPL-MCNC: 177 MG/DL (CALC)
PLATELET # BLD AUTO: 357 THOUSAND/UL (ref 140–400)
PMV BLD REES-ECKER: 10.6 FL (ref 7.5–12.5)
POTASSIUM SERPL-SCNC: 4.5 MMOL/L (ref 3.5–5.3)
PROT SERPL-MCNC: 6.7 G/DL (ref 6.1–8.1)
RBC # BLD AUTO: 4.49 MILLION/UL (ref 3.8–5.1)
SODIUM SERPL-SCNC: 140 MMOL/L (ref 135–146)
TRIGL SERPL-MCNC: 208 MG/DL
WBC # BLD AUTO: 5.8 THOUSAND/UL (ref 3.8–10.8)

## 2025-07-08 DIAGNOSIS — Z12.31 ENCOUNTER FOR SCREENING MAMMOGRAM FOR BREAST CANCER: ICD-10-CM

## 2025-08-06 ENCOUNTER — DOCUMENTATION (OUTPATIENT)
Dept: PHYSICAL THERAPY | Facility: CLINIC | Age: 65
End: 2025-08-06
Payer: COMMERCIAL

## 2025-08-07 NOTE — PROGRESS NOTES
Physical Therapy  Discharge Summary    Name: Abril Arreola  MRN: [id number]  : 1960  Date of DC: 25  Date of initial evaluation: 25    Functional Status at Discharge: Unable to assess due to non-compliance    Rehab Discharge Reason: Failed to schedule and/or keep follow-up appointment(s)    Discharge Plan: Unable to provide due to non-compliance    Was this episode resolved in Epic: Yes

## 2026-02-10 ENCOUNTER — APPOINTMENT (OUTPATIENT)
Dept: CARDIOLOGY | Facility: CLINIC | Age: 66
End: 2026-02-10
Payer: COMMERCIAL